# Patient Record
Sex: MALE | Race: WHITE | NOT HISPANIC OR LATINO | URBAN - METROPOLITAN AREA
[De-identification: names, ages, dates, MRNs, and addresses within clinical notes are randomized per-mention and may not be internally consistent; named-entity substitution may affect disease eponyms.]

---

## 2018-04-13 ENCOUNTER — EMERGENCY (EMERGENCY)
Facility: HOSPITAL | Age: 83
LOS: 1 days | Discharge: HOME | End: 2018-04-13
Attending: EMERGENCY MEDICINE | Admitting: EMERGENCY MEDICINE
Payer: MEDICARE

## 2018-04-13 VITALS
OXYGEN SATURATION: 99 % | DIASTOLIC BLOOD PRESSURE: 89 MMHG | RESPIRATION RATE: 18 BRPM | HEART RATE: 88 BPM | TEMPERATURE: 98 F | SYSTOLIC BLOOD PRESSURE: 178 MMHG

## 2018-04-13 PROCEDURE — 99282 EMERGENCY DEPT VISIT SF MDM: CPT

## 2018-04-13 NOTE — ED PROVIDER NOTE - OBJECTIVE STATEMENT
81 yo M with Hx of stroke and hemapheresis to right arm from stroke presents c/o abrasion to outside of right forearm s/p fall. Pt states suffered mechanical fall causing skin tear to right forearm. Denies head trauma, fever, chills, HA, N/V. Tetanus is up to date. 81 yo M with Hx of stroke and hemapheresis to right LE from previous stroke presents c/o skin tear to outside of right forearm s/p fall. Pt states suffered mechanical fall causing skin tear to right forearm. Denies head trauma, fever, chills, HA, N/V. Tetanus is up to date.

## 2018-04-13 NOTE — ED PROVIDER NOTE - MEDICAL DECISION MAKING DETAILS
Pt presents c/o abrasion to right forearm s/p mechanical fall. Denies head trauma or any LOC. Will cleanse wound, remove dead skin to prevent chance of infection, and dress in bacitracin and apply wrap. Have given conservative care management with strict return precautions. Pt presents c/o abrasion to right forearm s/p mechanical fall. Denies head trauma or any LOC. Will cleanse wound, remove dead skin to prevent chance of infection, and dress with xeroform and bacitracin and apply dimas wrap. Have given wound care management with strict return precautions.  PCP f/u.

## 2018-04-13 NOTE — ED ADULT NURSE NOTE - CHPI ED SYMPTOMS NEG
no weakness/no bleeding/no confusion/no numbness/no tingling/no loss of consciousness/no vomiting/no deformity/no fever

## 2018-04-13 NOTE — ED ADULT TRIAGE NOTE - CHIEF COMPLAINT QUOTE
Pt. s/p mechanical fall with skin tear to his right arm. Denies hitting his head. Pt. with baseline unsteady gait tue to weakness on his right side from previous stroke
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
## 2018-04-17 DIAGNOSIS — S51.811A LACERATION WITHOUT FOREIGN BODY OF RIGHT FOREARM, INITIAL ENCOUNTER: ICD-10-CM
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
## 2019-04-22 ENCOUNTER — FORM ENCOUNTER (OUTPATIENT)
Age: 84
End: 2019-04-22

## 2019-04-22 PROBLEM — Z00.00 ENCOUNTER FOR PREVENTIVE HEALTH EXAMINATION: Status: ACTIVE | Noted: 2019-04-22

## 2019-04-23 ENCOUNTER — APPOINTMENT (OUTPATIENT)
Dept: RADIOLOGY | Facility: CLINIC | Age: 84
End: 2019-04-23
Payer: MEDICARE

## 2019-04-23 ENCOUNTER — APPOINTMENT (OUTPATIENT)
Dept: ORTHOPEDIC SURGERY | Facility: CLINIC | Age: 84
End: 2019-04-23
Payer: MEDICARE

## 2019-04-23 ENCOUNTER — OUTPATIENT (OUTPATIENT)
Dept: OUTPATIENT SERVICES | Facility: HOSPITAL | Age: 84
LOS: 1 days | End: 2019-04-23

## 2019-04-23 VITALS — WEIGHT: 165 LBS | HEIGHT: 69 IN | BODY MASS INDEX: 24.44 KG/M2 | RESPIRATION RATE: 16 BRPM

## 2019-04-23 DIAGNOSIS — Z82.49 FAMILY HISTORY OF ISCHEMIC HEART DISEASE AND OTHER DISEASES OF THE CIRCULATORY SYSTEM: ICD-10-CM

## 2019-04-23 DIAGNOSIS — Z87.39 PERSONAL HISTORY OF OTHER DISEASES OF THE MUSCULOSKELETAL SYSTEM AND CONNECTIVE TISSUE: ICD-10-CM

## 2019-04-23 DIAGNOSIS — Z86.73 PERSONAL HISTORY OF TRANSIENT ISCHEMIC ATTACK (TIA), AND CEREBRAL INFARCTION W/OUT RESIDUAL DEFICITS: ICD-10-CM

## 2019-04-23 DIAGNOSIS — S76.319A STRAIN OF MUSCLE, FASCIA AND TENDON OF THE POSTERIOR MUSCLE GROUP AT THIGH LEVEL, UNSPECIFIED THIGH, INITIAL ENCOUNTER: ICD-10-CM

## 2019-04-23 PROCEDURE — 99203 OFFICE O/P NEW LOW 30 MIN: CPT

## 2019-04-23 PROCEDURE — 72170 X-RAY EXAM OF PELVIS: CPT | Mod: 26

## 2019-04-24 ENCOUNTER — TRANSCRIPTION ENCOUNTER (OUTPATIENT)
Age: 84
End: 2019-04-24

## 2020-08-20 ENCOUNTER — APPOINTMENT (OUTPATIENT)
Dept: ORTHOPEDIC SURGERY | Facility: CLINIC | Age: 85
End: 2020-08-20

## 2020-10-15 ENCOUNTER — EMERGENCY (EMERGENCY)
Facility: HOSPITAL | Age: 85
LOS: 1 days | Discharge: HOME | End: 2020-10-15
Attending: EMERGENCY MEDICINE | Admitting: EMERGENCY MEDICINE
Payer: MEDICARE

## 2020-10-15 VITALS
RESPIRATION RATE: 18 BRPM | HEART RATE: 92 BPM | WEIGHT: 169.98 LBS | TEMPERATURE: 98 F | HEIGHT: 69 IN | DIASTOLIC BLOOD PRESSURE: 108 MMHG | SYSTOLIC BLOOD PRESSURE: 204 MMHG | OXYGEN SATURATION: 97 %

## 2020-10-15 VITALS
SYSTOLIC BLOOD PRESSURE: 200 MMHG | RESPIRATION RATE: 18 BRPM | DIASTOLIC BLOOD PRESSURE: 74 MMHG | HEART RATE: 75 BPM | OXYGEN SATURATION: 99 %

## 2020-10-15 PROCEDURE — 99282 EMERGENCY DEPT VISIT SF MDM: CPT

## 2020-10-15 NOTE — ED PROVIDER NOTE - SIGNIFICANT NEGATIVE FINDINGS
Denies: neck or back pain, headache, chest pain, palpitations, abd pain, n/v/d, weakness, dizziness, numbness.

## 2020-10-15 NOTE — ED PROVIDER NOTE - PATIENT PORTAL LINK FT
You can access the FollowMyHealth Patient Portal offered by [facility] by registering at the following website: [email or web address]. By joining [employer]’s FollowMyHealth portal, you will also be able to view your health information using other applications (apps) compatible with our system.

## 2020-10-15 NOTE — ED PROVIDER NOTE - OBJECTIVE STATEMENT
83 y/o M w/ PMHx CVA residual right LE paresis  x 5 yrs ago presents to the ED today c/o painful RLE non healing wound after bumping into an object x 3- 4 weeks ago. He has been applying an ointment which he does not recall the name and wrapping the LE with no improvement of healing and reports some fluid draining, non mal odorous and noted some erythema surrounding the wound. Pt reports recent annual check up with PCP last week he had basic labs WNL, BP within normal limits. He is currently taking "low dose" Lasix for dependent edema. Pt reports taking baby ASA (81 mg) daily.     Denies fevers/chills, neck or back pain, headache, chest pain, palpitations, abd pain, n/v/d, weakness, dizziness, numbness. 85 y/o M w/ PMHx CVA residual right LE paresis  x 5 yrs ago presents to the ED today c/o painful RLE non healing wound after bumping into an object x 3- 4 weeks ago. He has been applying an ointment which he does not recall the name and wrapping the LE with no improvement of healing and reports some fluid draining, non mal odorous and noted some erythema surrounding the wound. Pt reports recent annual check up with PCP last week he had basic labs WNL, BP within normal limits. He is currently taking "low dose" Lasix for dependent edema. Pt reports taking baby ASA (81 mg) daily. Pt uses roller walker for ambulation.    Denies fevers/chills, neck or back pain, headache, chest pain, palpitations, abd pain, n/v/d, weakness, dizziness, numbness.

## 2020-10-15 NOTE — ED ADULT TRIAGE NOTE - CHIEF COMPLAINT QUOTE
pt. c/o pain to the right lower leg after hitting it, pt. reports small wound not healing well that keeps "oozing".

## 2020-10-15 NOTE — ED PROVIDER NOTE - CLINICAL SUMMARY MEDICAL DECISION MAKING FREE TEXT BOX
83 y/o M w/PMHx of CVA with residual RLE paresis presents to the ED today c/o non healing RLE wound x 3-4 weeks. On exam, pt well appearing in no apparent acute distress, RLE: dime size open leaped skin with underlying granulation tissue no surrounding warmth, or signs of infection. Skin hyperpigmentation changes R>L c/w chronic venous insufficiency, diffuse small bulging varicose veins over the feet. No signs of cellulitis, and no indication for PO/topical abx. Will apply dressing to RLE and pt will f/u with PMD and Vascular. 85 y/o M w/PMHx of CVA with residual RLE paresis presents to the ED today c/o non healing RLE wound x 3-4 weeks. On exam, pt well appearing in no apparent acute distress, RLE: dime size open leaped skin with underlying granulation tissue no surrounding warmth, or signs of infection. Skin hyperpigmentation changes R>L c/w chronic venous insufficiency, diffuse small bulging varicose veins over the feet. No signs of cellulitis, and no indication for PO/topical abx. Will apply medihoney dressing to RLE and pt will f/u with PMD and Vascular.

## 2020-10-15 NOTE — ED PROVIDER NOTE - PROVIDER TOKENS
PROVIDER:[TOKEN:[[id number]]],PROVIDER:[TOKEN:[[id number]]],PROVIDER:[TOKEN:[[id number]]],PROVIDER:[TOKEN:[[id number]]],PROVIDER:[TOKEN:[[id number]]]

## 2020-10-15 NOTE — ED PROVIDER NOTE - SKIN, MLM
BLE edema from below the knee to the ankle RLE: dime size open wound with serous drainage and surrounding erythema, diffuse varicose veins over the dorsal aspect of B/L feet. Diffuse spider veins over B/L feet. Feet are pink, warm to touch with delayed capillary refill. BLE: edema from below the knee to the ankle RLE: dime size open leaped skin with underlying granulation tissue no surrounding warmth, or signs of infection. Skin hyperpigmentation changes R>L.

## 2020-10-15 NOTE — ED PROVIDER NOTE - NSFOLLOWUPINSTRUCTIONS_ED_ALL_ED_FT
Please apply a new Medihoney dressing ever 10-2 days. Return for signs of infection.    Followup with one of our vascular MDs and new PMDs.

## 2020-10-15 NOTE — ED PROVIDER NOTE - CARE PROVIDERS DIRECT ADDRESSES
,[email or web address]

## 2020-10-15 NOTE — ED PROVIDER NOTE - CONSTITUTIONAL, MLM
normal... Well appearing, awake, alert, oriented to person, place, time/situation and in no apparent distress. Well appearing, awake, alert, oriented to person, place, time/situation and in no apparent distress. Ambulates with a rolling walker.

## 2020-10-15 NOTE — ED PROVIDER NOTE - CARE PROVIDER_API CALL
Bernie Bartholomew  VASCULAR SURGERY  [address], [address] [place] [address]  Phone: [phone]  Fax: [phone]  Follow Up Time:     Alexandre Mcdaniel)  Internal Medicine  [address] [place], [place] [id number]  Phone: [phone]  Fax: [phone]  Follow Up Time:     LAUREN WHITE  INTERNAL MEDICINE  [address] [address]  Phone: [phone]  Fax: [phone]  Follow Up Time:     Ruba Gan  INTERNAL MEDICINE  [address] [place] [address]  Phone: [phone]  Fax: [phone]  Follow Up Time:     Leyda Rivas (DO)  [address]  [address] [address]  Phone: [phone]  Fax: [phone]  Follow Up Time:

## 2020-10-15 NOTE — ED ADULT NURSE REASSESSMENT NOTE - NS ED NURSE REASSESS COMMENT FT1
vital signs rechecked. bp elevated. md made aware. pt denies any dizziness, cp, headache, sob. states, "my blood pressure is always up and down, especially after I am walking and moving around".

## 2020-10-15 NOTE — ED ADULT NURSE NOTE - NSHOSCREENINGQ1_ED_ALL_ED
PHOTOGRAPHS: I have reviewed the external ocular photographs of this patient which show the following: significant dermatochalasis of both upper eyelids. No
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
## 2020-10-19 DIAGNOSIS — M79.661 PAIN IN RIGHT LOWER LEG: ICD-10-CM
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
## 2020-10-19 DIAGNOSIS — S81.801A UNSPECIFIED OPEN WOUND, RIGHT LOWER LEG, INITIAL ENCOUNTER: ICD-10-CM

## 2021-07-03 NOTE — ED PROVIDER NOTE - ATTESTATION, MLM
As tolerated  
I have reviewed and confirmed nurses' notes for patient's medications, allergies, medical history, and surgical history.

## 2021-12-08 NOTE — ED ADULT NURSE NOTE - RESPIRATORY WDL
Breathing spontaneous and unlabored. Breath sounds clear and equal bilaterally with regular rhythm. Siliq Counseling:  I discussed with the patient the risks of Siliq including but not limited to new or worsening depression, suicidal thoughts and behavior, immunosuppression, malignancy, posterior leukoencephalopathy syndrome, and serious infections.  The patient understands that monitoring is required including a PPD at baseline and must alert us or the primary physician if symptoms of infection or other concerning signs are noted. There is also a special program designed to monitor depression which is required with Siliq.

## 2022-05-31 NOTE — ED PROVIDER NOTE - SCRIBE NAME
FYI
Patient was offered choice of vendor and chose [facility].  Patient Jann Elizabeth was set up at [facility]  on May 31, 2022. Patient received a [employer]med Airsense 11 Pressures were set at 5-15 cm H2O.   Patient s ramp is 5 cm H2O for Auto and FLEX/EPR is EPR, 2.  Patient received a [employer] Mask name: [id number]  Nasal mask size Standard, heated tubing and heated humidifier.  Patient does not need to meet compliance.   Cindy Chino   
Maynor Nogueira